# Patient Record
Sex: FEMALE | Race: WHITE | NOT HISPANIC OR LATINO | Employment: OTHER | URBAN - METROPOLITAN AREA
[De-identification: names, ages, dates, MRNs, and addresses within clinical notes are randomized per-mention and may not be internally consistent; named-entity substitution may affect disease eponyms.]

---

## 2017-11-09 ENCOUNTER — TELEPHONE (OUTPATIENT)
Dept: MAMMOGRAPHY | Facility: CLINIC | Age: 57
End: 2017-11-09

## 2017-11-13 ENCOUNTER — OFFICE VISIT (OUTPATIENT)
Dept: MAMMOGRAPHY | Facility: CLINIC | Age: 57
End: 2017-11-13

## 2017-11-13 VITALS
TEMPERATURE: 98.1 F | DIASTOLIC BLOOD PRESSURE: 60 MMHG | SYSTOLIC BLOOD PRESSURE: 112 MMHG | BODY MASS INDEX: 45.32 KG/M2 | WEIGHT: 282 LBS | HEIGHT: 66 IN | OXYGEN SATURATION: 97 % | HEART RATE: 88 BPM

## 2017-11-13 DIAGNOSIS — N64.4 MASTODYNIA: Primary | ICD-10-CM

## 2017-11-13 DIAGNOSIS — N63.0 LUMP OR MASS IN BREAST: ICD-10-CM

## 2017-11-13 PROCEDURE — 99214 OFFICE O/P EST MOD 30 MIN: CPT | Performed by: SURGERY

## 2017-11-13 RX ORDER — CYCLOBENZAPRINE HCL 10 MG
10 TABLET ORAL
COMMUNITY
Start: 2015-11-24 | End: 2022-04-12

## 2017-11-13 RX ORDER — METHOCARBAMOL 750 MG/1
750-1500 TABLET, FILM COATED ORAL
COMMUNITY
Start: 2017-04-05 | End: 2022-04-12

## 2017-11-13 NOTE — PROGRESS NOTES
Chief Complaint: Guerline Foley is a 57 y.o.. female here today for Mass (right breast) and Breast Pain (right)        History of Present Illness:  Patient presents with right breast mass and breast pain.  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  She was also treated with radiation therapy as part of her treatment.  She has had some off and on pain in that breast upper since the surgery but the patient states that over the last 3 months or so the pain has worsened.  She points to multiple areas on the breast where she has her pain and describes it as sharp and mostly constant.  It is worsened with palpation.  The patient underwent imaging studies and May of this year.  The breast tissue was felt to be mostly fatty replaced.  There are some changes at the lumpectomy site in the upper outer quadrant.  There was a 3.3 cm fat containing mass near the edge of the areola in the upper outer quadrant away from the lumpectomy site.  This was also ultrasounded and had imaging features consistent with fat necrosis.  I have personally reviewed those imaging studies and agree with the findings.  The patient has some upper spine issues for which she has seen a pain management doctor and apparently had some epidurals.      Review of Systems:  Review of Systems   Musculoskeletal: Positive for back pain, neck pain and neck stiffness.        Limited ROM in both shoulders   Skin:        The patient denies any noticeable changes to the skin of the breast.    Psychiatric/Behavioral: Positive for depression and sleep disturbance. The patient is nervous/anxious.    All other systems reviewed and are negative.       Past Medical and Surgical History:  Breast Biopsy History:  Patient has had the following breast biopsies:Right breast 2015-malignant  Breast Cancer HIstory:  Patient has the following past medical history of breast cancer treatment: high-grade DCIS  which was ER negative and [place] negative. Had radiation treatment and lumpectomy  Breast Operations, and year:  Right Lumpectomy 2015 per Dr. Valdez    History   Smoking Status   • Current Every Day Smoker   • Packs/day: 0.50   • Types: Cigarettes   • Start date: 1971   Smokeless Tobacco   • Not on file     Obstetric History:  Patient is postmenopausal, entered menopause naturally at age: 47   Number of pregnancies:3  Number of live births: 3  Number of abortions or miscarriages: 0  Age of delivery of first child: 18  Patient did not breast feed.  Length of time taking birth control pills:10 years  Patient has never taken hormone replacement    Past Surgical History:   Procedure Laterality Date   • BREAST LUMPECTOMY Right    • CHOLECYSTECTOMY     • HERNIA REPAIR      X 2   • TUBAL ABDOMINAL LIGATION         Past Medical History:   Diagnosis Date   • Anxiety    • Arthritis    • Chronic back pain    • DCIS (ductal carcinoma in situ)    • Depression    • Hyperlipidemia    • Vitamin D deficiency        Prior Hospitalizations, other than for surgery or childbirth, and year:  none    Social History:  Patient is .  Patient has 3 daughters.    Family History:  Family History   Problem Relation Age of Onset   • Breast cancer Mother 39   • Alcohol abuse Mother    • Bone cancer Mother 49   • Stomach cancer Maternal Uncle    • Lung cancer Maternal Uncle    • Alcohol abuse Father    • Alcohol abuse Sister    • Depression Sister    • Drug abuse Sister    • Alcohol abuse Brother    • Depression Brother    • Drug abuse Brother    • Cancer Maternal Aunt      unsure of what kind   • Breast cancer Cousin 50   • Brain cancer Cousin 60   • Breast cancer Cousin 50   • Breast cancer Cousin      pt states possibly 7 out of 10 Mat cousins with breast cancer   • Breast cancer Cousin    • Cancer Maternal Aunt      unsure of what kind   • Lung cancer Maternal Uncle        Vital Signs:  Vitals:    11/13/17 1120   BP: 112/60   Pulse: 88    Temp: 98.1 °F (36.7 °C)   SpO2: 97%       Medications:    Current Outpatient Prescriptions:     Current Outpatient Prescriptions:   •  ALPRAZolam (XANAX) 0.5 MG tablet, Take  by mouth., Disp: , Rfl:   •  buPROPion (WELLBUTRIN) 100 MG tablet, Take 100 mg by mouth 2 (two) times a day., Disp: , Rfl:   •  Cholecalciferol (VITAMIN D3) 56271 UNITS capsule, Take  by mouth., Disp: , Rfl:   •  cyclobenzaprine (FLEXERIL) 10 MG tablet, Take 10 mg by mouth., Disp: , Rfl:   •  gabapentin (NEURONTIN) 300 MG capsule, Take 300 mg by mouth 3 (three) times a day., Disp: , Rfl:   •  HYDROcodone-acetaminophen (HYCET) 7.5-325 MG/15ML solution, Take  by mouth., Disp: , Rfl:   •  sertraline (ZOLOFT) 100 MG tablet, Take  by mouth., Disp: , Rfl:   •  methocarbamol (ROBAXIN) 750 MG tablet, Take 750-1,500 mg by mouth., Disp: , Rfl:     Physical Examination:  General Appearance:   Patient is in no distress.  She is well kept and has an obese build.   Psychiatric:  Patient with appropriate mood and affect. Alert and oriented to self, time, and place.    Breast, RIGHT:  large sized, symmetric with the contralateral side. there is significant ptosis   Breast skin is without erythema, edema, rashes.  There are no visible abnormalities upon inspection during the arm-raising maneuver or with hands on hips in the sitting position. There is no nipple retraction, discharge or nipple/areolar skin changes.There is a palpable mass near the edge of the areola in the upper outer quadrant that is well circumscribed and quite tender.  The lumpectomy scar is present farther up in the upper outer quadrant and she also has an axillary incision.  The lumpectomy scar is also tender and the patient is also tender in the retroareolar and upper inner quadrant.    Breast, LEFT:  large sized, symmetric with the contralateral side.  there is significant ptosis Breast skin is without erythema, edema, rashes.  There are no visible abnormalities upon inspection during  the arm-raising maneuver or with hands on hips in the sitting position. There is no nipple retraction, discharge or nipple/areolar skin changes.There are no masses palpable in the sitting or supine positions.  There is no breast tenderness on this side.    Lymphatic:  There is no axillary, cervical, infraclavicular, or supraclavicular adenopathy bilaterally.  Eyes:  Pupils are round and reactive to light.  Cardiovascular:  Heart rate and rhythm are regular.  Respiratory:  Lungs are clear bilaterally with no crackles or wheezes in any lung field.  There are decreased breath sounds bilaterally  Gastrointestinal:  Abdomen is soft, nondistended, and nontender.  There is no obvious hepatosplenomegaly There are no scars from previous surgery.    Musculoskeletal:  Good strength in all 4 extremities.   There is good range of motion in both shoulders.    Skin:  No new skin lesions or rashes on the skin excluding the breast (see breast exam above).    Assessment:  1. Mastodynia    2. Lump or mass in breast          Plan:  I think the palpable lump is probably secondary to the methylene blue injection.  If this was the only tender area I would consider excising it but she is tender in multiple other areas and I do not think she would benefit from something done in this single area.  She also has had radiation which would make healing much more difficult.  I do think that she would benefit from better breast support.  She is currently wearing a somewhat loose sports bra.  I have encouraged her to contact [facility] or [facility] to inquire about better support bras.  She would also probably benefit from seeing a pain management doctor particularly on the chance that some of her back issues are playing a role here.  I otherwise do not have much to offer her at this point in time.      CPT coding:    Next Appointment:  No Follow-up on file.            EMR Dragon/transcription disclaimer:    Much of this encounter note is an  electronic transcription/translocation of spoken language to printed text.  The electronic translation of spoken language may permit erroneous, or at times, nonsensical words or phrases to be inadvertently transcribed.  Although I have reviewed the note from such areas, some may still exist.

## 2021-03-22 ENCOUNTER — BULK ORDERING (OUTPATIENT)
Dept: CASE MANAGEMENT | Facility: OTHER | Age: 61
End: 2021-03-22

## 2021-03-22 DIAGNOSIS — Z23 IMMUNIZATION DUE: ICD-10-CM

## 2022-04-12 ENCOUNTER — LAB (OUTPATIENT)
Dept: LAB | Facility: HOSPITAL | Age: 62
End: 2022-04-12

## 2022-04-12 ENCOUNTER — CONSULT (OUTPATIENT)
Dept: ONCOLOGY | Facility: CLINIC | Age: 62
End: 2022-04-12

## 2022-04-12 VITALS
TEMPERATURE: 96.8 F | RESPIRATION RATE: 16 BRPM | BODY MASS INDEX: 39.86 KG/M2 | HEIGHT: 68 IN | HEART RATE: 85 BPM | WEIGHT: 263 LBS | SYSTOLIC BLOOD PRESSURE: 121 MMHG | DIASTOLIC BLOOD PRESSURE: 81 MMHG | OXYGEN SATURATION: 95 %

## 2022-04-12 DIAGNOSIS — D05.11 DUCTAL CARCINOMA IN SITU (DCIS) OF RIGHT BREAST: Primary | ICD-10-CM

## 2022-04-12 LAB
BASOPHILS # BLD AUTO: 0.06 10*3/MM3 (ref 0–0.2)
BASOPHILS NFR BLD AUTO: 0.5 % (ref 0–1.5)
DEPRECATED RDW RBC AUTO: 42.9 FL (ref 37–54)
EOSINOPHIL # BLD AUTO: 0.08 10*3/MM3 (ref 0–0.4)
EOSINOPHIL NFR BLD AUTO: 0.7 % (ref 0.3–6.2)
ERYTHROCYTE [DISTWIDTH] IN BLOOD BY AUTOMATED COUNT: 12.6 % (ref 12.3–15.4)
HCT VFR BLD AUTO: 43.4 % (ref 34–46.6)
HGB BLD-MCNC: 14.2 G/DL (ref 12–15.9)
IMM GRANULOCYTES # BLD AUTO: 0.08 10*3/MM3 (ref 0–0.05)
IMM GRANULOCYTES NFR BLD AUTO: 0.7 % (ref 0–0.5)
LYMPHOCYTES # BLD AUTO: 3.34 10*3/MM3 (ref 0.7–3.1)
LYMPHOCYTES NFR BLD AUTO: 30.3 % (ref 19.6–45.3)
MCH RBC QN AUTO: 30.7 PG (ref 26.6–33)
MCHC RBC AUTO-ENTMCNC: 32.7 G/DL (ref 31.5–35.7)
MCV RBC AUTO: 93.7 FL (ref 79–97)
MONOCYTES # BLD AUTO: 0.59 10*3/MM3 (ref 0.1–0.9)
MONOCYTES NFR BLD AUTO: 5.4 % (ref 5–12)
NEUTROPHILS NFR BLD AUTO: 6.86 10*3/MM3 (ref 1.7–7)
NEUTROPHILS NFR BLD AUTO: 62.4 % (ref 42.7–76)
NRBC BLD AUTO-RTO: 0 /100 WBC (ref 0–0.2)
PLATELET # BLD AUTO: 258 10*3/MM3 (ref 140–450)
PMV BLD AUTO: 10.2 FL (ref 6–12)
RBC # BLD AUTO: 4.63 10*6/MM3 (ref 3.77–5.28)
WBC NRBC COR # BLD: 11.01 10*3/MM3 (ref 3.4–10.8)

## 2022-04-12 PROCEDURE — 85025 COMPLETE CBC W/AUTO DIFF WBC: CPT

## 2022-04-12 PROCEDURE — 36415 COLL VENOUS BLD VENIPUNCTURE: CPT

## 2022-04-12 PROCEDURE — 99204 OFFICE O/P NEW MOD 45 MIN: CPT | Performed by: INTERNAL MEDICINE

## 2022-04-12 RX ORDER — CARISOPRODOL 350 MG/1
TABLET ORAL
COMMUNITY
End: 2022-04-12

## 2022-04-12 RX ORDER — GABAPENTIN 400 MG/1
CAPSULE ORAL EVERY 8 HOURS SCHEDULED
COMMUNITY
Start: 2022-02-17

## 2022-04-12 RX ORDER — DICLOFENAC SODIUM 75 MG/1
TABLET, DELAYED RELEASE ORAL
COMMUNITY
End: 2022-04-12

## 2022-04-12 RX ORDER — LEVOFLOXACIN 500 MG/1
TABLET, FILM COATED ORAL
COMMUNITY
End: 2022-04-12

## 2022-04-12 RX ORDER — LEVOFLOXACIN 750 MG/1
TABLET ORAL
COMMUNITY

## 2022-04-12 RX ORDER — ATORVASTATIN CALCIUM 20 MG/1
TABLET, FILM COATED ORAL
COMMUNITY
End: 2022-04-12

## 2022-04-12 RX ORDER — OXYBUTYNIN CHLORIDE 5 MG/1
1 TABLET ORAL 2 TIMES DAILY
COMMUNITY
End: 2022-04-12

## 2022-04-12 RX ORDER — METFORMIN HYDROCHLORIDE 500 MG/5ML
SOLUTION ORAL
COMMUNITY
End: 2022-11-10

## 2022-04-12 RX ORDER — TIZANIDINE HYDROCHLORIDE 4 MG/1
CAPSULE, GELATIN COATED ORAL
COMMUNITY

## 2022-04-12 NOTE — PROGRESS NOTES
Subjective     REASON FOR CONSULTATION: DCIS right breast ER/[place] negative post excision with clear margins with a history of previous DCIS right breast [place] negative treated with lumpectomy and radiation in 2015  Provide an opinion on any further workup or treatment                             REQUESTING PHYSICIAN: MD Gagandeep Tejeda MD    RECORDS OBTAINED:  Records of the patients history including those obtained from the referring provider were reviewed and summarized in detail.    HISTORY OF PRESENT ILLNESS:  The patient is a 61 y.o. year old female who is here for an opinion about the above issue.    History of Present Illness patient is a 61-year-old female with a history of depression chronic back pain and severe arthritis in her left knee was noted on routine imaging In January of this year to have abnormal calcifications in the upper outer quadrant of the right breast and if it coming that required diagnostic imaging and biopsy Which was done on 2/9/2022 with the findings of DCIS intermediate grade measuring 4 mm ER/[place] negative.  Patient was referred to Dr. Carvajal and underwent Lumpectomy on 3/8/2022 months at which time there was a 1.3 mm area of residual high-grade DCIS.    The patient was referred to us to discuss further options.  She tells me she had genetic testing in 2015 with a 32 gene panel being negative but Dr. Carvajal recommended further testing because he is concerned her family history is so significant and he wonders if there may be some newer genes that can be tested .    Patient was diagnosed with high-grade DCIS of the right breast in 2015 when her mammogram showed abnormal calcifications in the upper outer quadrant of the right breast close to the area which was biopsied this time.  She had a lumpectomy the findings of 17 mm of high-grade DCIS there was a margin that was 0.5 mm and another that was 0.8 mm With 2 negative- sentinel  nodes also  ER/[place] negative at that time radiation was given but no prevention because she was ER/[place] negative    She is  3 para 3 first childbirth was at age 18 she did not breast-feed  Menarche was 11 menopause at age 47 she did not take hormone replacement    Family history is positive for mother  of breast cancer at age 49 she was diagnosed at age 37 she has 3 maternal first cousins with cancer 2 with breast cancer and 1 with an unknown type?  Ovarian she is 3 maternal uncles with cancer one with stomach cancer mother with lung cancer mother with colon cancer  She had a 32 gene panel which was negative in     She smoked a pack a day for 50 years and is down to 2 packs a week and is trying to stop.  She is not a drinker  She never had a DVT MI or stroke    She is wanting to get her left knee replaced soon as possible because it is causing a lot of problems    Past Medical History:   Diagnosis Date   • Anxiety    • Arthritis    • Chronic back pain    • DCIS (ductal carcinoma in situ)    • Depression    • Hyperlipidemia    • Vitamin D deficiency         Past Surgical History:   Procedure Laterality Date   • BREAST LUMPECTOMY Right    • CHOLECYSTECTOMY     • HERNIA REPAIR      X 2   • TUBAL ABDOMINAL LIGATION          Current Outpatient Medications on File Prior to Visit   Medication Sig Dispense Refill   • ALPRAZolam (XANAX) 0.5 MG tablet Take  by mouth.     • buPROPion (WELLBUTRIN) 100 MG tablet Take 100 mg by mouth 2 (two) times a day.     • Cholecalciferol (VITAMIN D3) 55715 UNITS capsule Take  by mouth.     • gabapentin (NEURONTIN) 400 MG capsule Every 8 (Eight) Hours.     • HYDROcodone-acetaminophen (HYCET) 7.5-325 MG/15ML solution Take  by mouth.     • levoFLOXacin (LEVAQUIN) 750 MG tablet levofloxacin 750 mg tablet     • metFORMIN HCl 500 MG/5ML solution TK 1 T PO  BID WITH BREAKFAST AND SUPPER     • Multiple Vitamins-Minerals (MULTIVITAMIN ADULT EXTRA C PO) multivitamin   1 a day     •  sertraline (ZOLOFT) 100 MG tablet Take  by mouth.     • TiZANidine (ZANAFLEX) 4 MG capsule 1 tablet as needed     • [DISCONTINUED] atorvastatin (LIPITOR) 20 MG tablet atorvastatin 20 mg tablet     • [DISCONTINUED] carisoprodol (SOMA) 350 MG tablet carisoprodol 350 mg tablet     • [DISCONTINUED] cyclobenzaprine (FLEXERIL) 10 MG tablet Take 10 mg by mouth.     • [DISCONTINUED] diclofenac (VOLTAREN) 75 MG EC tablet diclofenac sodium 75 mg tablet,delayed release   TAKE 1 TABLET BY MOUTH TWICE DAILY     • [DISCONTINUED] gabapentin (NEURONTIN) 300 MG capsule Take 300 mg by mouth 3 (three) times a day.     • [DISCONTINUED] levoFLOXacin (LEVAQUIN) 500 MG tablet levofloxacin 500 mg tablet     • [DISCONTINUED] methocarbamol (ROBAXIN) 750 MG tablet Take 750-1,500 mg by mouth.     • [DISCONTINUED] oxybutynin (DITROPAN) 5 MG tablet Take 1 tablet by mouth 2 (Two) Times a Day.     • [DISCONTINUED] topiramate (TOPAMAX) 200 MG tablet Every 12 (Twelve) Hours.       No current facility-administered medications on file prior to visit.        ALLERGIES:    Allergies   Allergen Reactions   • Atorvastatin    • Meperidine    • Morphine And Related    • Shellfish-Derived Products         Social History     Socioeconomic History   • Marital status: Single     Spouse name: Sher Mckinnon   • Years of education: College   Tobacco Use   • Smoking status: Current Every Day Smoker     Packs/day: 0.50     Types: Cigarettes     Start date: 1971   Substance and Sexual Activity   • Alcohol use: No   • Drug use: No   • Sexual activity: Yes     Partners: Male     Comment:         Family History   Problem Relation Age of Onset   • Breast cancer Mother 39   • Alcohol abuse Mother    • Bone cancer Mother 49   • Stomach cancer Maternal Uncle    • Lung cancer Maternal Uncle    • Alcohol abuse Father    • Alcohol abuse Sister    • Depression Sister    • Drug abuse Sister    • Alcohol abuse Brother    • Depression Brother    • Drug abuse Brother   "  • Cancer Maternal Aunt         unsure of what kind   • Breast cancer Cousin 50   • Brain cancer Cousin 60   • Breast cancer Cousin 50   • Breast cancer Cousin         pt states possibly 7 out of 10 Mat cousins with breast cancer   • Breast cancer Cousin    • Cancer Maternal Aunt         unsure of what kind   • Lung cancer Maternal Uncle         Review of Systems   Musculoskeletal: Positive for gait problem and joint swelling (left knee).        Objective     Vitals:    04/12/22 1542   BP: 121/81   Pulse: 85   Resp: 16   Temp: 96.8 °F (36 °C)   TempSrc: Temporal   SpO2: 95%   Weight: 119 kg (263 lb)   Height: 172.7 cm (68\")   PainSc:   6   PainLoc: Knee     Current Status 4/12/2022   ECOG score 1       Physical Exam  Chest:            CONSTITUTIONAL:  Vital signs reviewed.  No distress, looks comfortable.  EYES:  Conjunctivae and lids unremarkable.  PERRLA  EARS,NOSE,MOUTH,THROAT:  Ears and nose appear unremarkable.  Lips, teeth, gums appear unremarkable.  RESPIRATORY:  Normal respiratory effort.  Lungs clear to auscultation bilaterally.  BREASTS: Left breast is benign the right breast shows recent lumpectomy scar in the upper outer quadrant an old scar that was healed there is a firm nodule under the areola at 10:00 which she states is old and due to scarring from previous radiation  CARDIOVASCULAR:  Normal S1, S2.  No murmurs rubs or gallops.  No significant lower extremity edema.  Left knee is bandaged and  GASTROINTESTINAL: Abdomen appears unremarkable.  Nontender.  No hepatomegaly.  No splenomegaly.  LYMPHATIC:  No cervical, supraclavicular, axillary lymphadenopathy.  SKIN:  Warm.  No rashes.  PSYCHIATRIC:  Normal judgment and insight.  Normal mood and affect.      RECENT LABS:  Hematology WBC   Date Value Ref Range Status   04/12/2022 11.01 (H) 3.40 - 10.80 10*3/mm3 Final   10/12/2020 10.55 4.5 - 11.0 10*3/uL Final     RBC   Date Value Ref Range Status   04/12/2022 4.63 3.77 - 5.28 10*6/mm3 Final   10/12/2020 " "4.42 4.0 - 5.2 10*6/uL Final     Hemoglobin   Date Value Ref Range Status   04/12/2022 14.2 12.0 - 15.9 g/dL Final   10/12/2020 13.2 12.0 - 16.0 g/dL Final     Hematocrit   Date Value Ref Range Status   04/12/2022 43.4 34.0 - 46.6 % Final   10/12/2020 42.3 36.0 - 46.0 % Final     Platelets   Date Value Ref Range Status   04/12/2022 258 140 - 450 10*3/mm3 Final   10/12/2020 351 140 - 440 10*3/uL Final          Histologic Type:  Ductal carcinoma in situ (DCIS)        Comments:  Intermediate grade DCIS with associated microcalcifications and   apocrine cytology        Ca++ showing linear distribution sometimes replacing all breast epithelium   within ducts.        Negative for invasive carcinoma.        Breast Biomarkers        Estrogen Receptor (ER) Status:  Negative        Progesterone Receptor (PgR) Status:  Negative        Extent of In-Situ Carcinoma in the Core        % of DCIS:  50%        Number of Cores Total:  6        Size of DCIS in a Single Core (mm):  4        Architectural Patterns:  Solid        Nuclear Grade:  Grade II (intermediate)        Necrosis:  Not identified        Microcalcifications:  Present in DCIS         Synoptic Report:   Specimen        Procedure:  Excision (less than total mastectomy)        Specimen Laterality:  Right   Tumor        Tumor Site:  Upper outer quadrant        Histologic Type:  Ductal carcinoma in situ        Comments:  Few minute foci of residual intermediate to high grade DCIS   without microcalcifications in tissue surrounding prior biopsy site.        Size (Extent) of DCIS:  1.3 Millimeters (mm)        Number of Blocks with DCIS:  3        Number of Blocks Examined:  15        Architectural Patterns:  Comedo;  Cribriform        Nuclear Grade:  Grade III (high)        Necrosis:  Present, central (expansive \"comedo\" necrosis)        Microcalcifications:  Present in nonneoplastic tissue   Margins        Margin Status:  All margins negative for DCIS        Distance from " DCIS to Closest Margin:  Greater than 10 mm        Closest Margin(s) to DCIS:  Anterior;  Posterior;  Superior;  Inferior;   Medial;  Lateral        Comment on Final Margins:  Margin assessment includes evaluation of   additional margin excisions in parts B-E.   Regional Lymph Nodes        Regional Lymph Node Status:  Not applicable (no regional lymph nodes   submitted or found)   Distant Metastasis        Distant Site(s) Involved:  Not applicable   Pathologic Stage Classification (pTNM, AJCC 8th Edition)        TNM Descriptors:  Not applicable        pT Category:  pTis (DCIS)        Regional Lymph Nodes Modifier:  Not applicable        pN Category:  pN not assigned (no nodes submitted or found)        pM Category:  Not applicable - pM cannot be determined from the submitted   specimen(s)   Additional Findings        Additional Findings:  Prior biopsy site with clip; stromal calcifications   present in area of biopsy   Special Studies        Estrogen Receptor (ER) Status:  Negative        Progesterone Receptor (PgR) Status:  Negative        Testing Performed on Case Number:  [id number]   Comments        DCIS was more extensive in the prior right breast UOQ mid biopsy   ([id number]), where it was seen in multiple cores representing 50% of sampled   tissue and associated with microcalcifications.   Best Blocks for Future Studies:  Prior biopsy [id number] A1.      Assessment/Plan   1.pTis Nx right breast ER/FL negative recurrence in a radiated breast post lumpectomy-4 mm area  · History of high-grade DCIS right breast in 2015 treated with lumpectomy and radiation-margins were 0.5 and 0.8 mm at the time-no prevention given because of ER/FL negative status    2.  -32 gene genetic panel in 2015 with a strong family history of breast cancer including her mother was diagnosed at age 39    3.  Severe degenerative disease in the left knee requiring replacement    4.  Tobacco abuse for 50 years needs screening CAT scans of the  chest    5.  Anxiety and depression currently on Wellbutrin and Xanax and Zoloft    6.  Type 2 diabetes on Metformin    Plan  Discussed with patient that there is no benefit to tamoxifen or aromatase inhibitors in the setting of her ER/[place] negative DCIS.  There is no role for additional radiation at this time    I agree with Dr. Carvajal that with such a small area of involvement very close to her previous site of involvement with  a fatty replaced breast that is easy to image I think we can afford to watch closely but if there is any further DCIS mastectomy would be indicated.    I will plan to see her back in 6 months and she tells me she is getting additional genetic testing if anything shows up positive we will review this with her    In the setting of normal bone density I do not think Evista is indicated

## 2022-11-10 ENCOUNTER — OFFICE VISIT (OUTPATIENT)
Dept: ONCOLOGY | Facility: CLINIC | Age: 62
End: 2022-11-10

## 2022-11-10 ENCOUNTER — LAB (OUTPATIENT)
Dept: LAB | Facility: HOSPITAL | Age: 62
End: 2022-11-10

## 2022-11-10 ENCOUNTER — TELEPHONE (OUTPATIENT)
Dept: ONCOLOGY | Facility: OTHER | Age: 62
End: 2022-11-10

## 2022-11-10 VITALS
DIASTOLIC BLOOD PRESSURE: 67 MMHG | HEIGHT: 68 IN | RESPIRATION RATE: 16 BRPM | SYSTOLIC BLOOD PRESSURE: 115 MMHG | BODY MASS INDEX: 38.66 KG/M2 | WEIGHT: 255.1 LBS | OXYGEN SATURATION: 96 % | TEMPERATURE: 97.5 F | HEART RATE: 85 BPM

## 2022-11-10 DIAGNOSIS — D05.11 DUCTAL CARCINOMA IN SITU (DCIS) OF RIGHT BREAST: Primary | ICD-10-CM

## 2022-11-10 DIAGNOSIS — Z12.31 ENCOUNTER FOR SCREENING MAMMOGRAM FOR MALIGNANT NEOPLASM OF BREAST: ICD-10-CM

## 2022-11-10 DIAGNOSIS — D05.11 DUCTAL CARCINOMA IN SITU (DCIS) OF RIGHT BREAST: ICD-10-CM

## 2022-11-10 LAB
ALBUMIN SERPL-MCNC: 4.1 G/DL (ref 3.5–5.2)
ALBUMIN/GLOB SERPL: 1.4 G/DL (ref 1.1–2.4)
ALP SERPL-CCNC: 106 U/L (ref 38–116)
ALT SERPL W P-5'-P-CCNC: 16 U/L (ref 0–33)
ANION GAP SERPL CALCULATED.3IONS-SCNC: 11.8 MMOL/L (ref 5–15)
AST SERPL-CCNC: 17 U/L (ref 0–32)
BASOPHILS # BLD AUTO: 0.08 10*3/MM3 (ref 0–0.2)
BASOPHILS NFR BLD AUTO: 0.7 % (ref 0–1.5)
BILIRUB SERPL-MCNC: 0.2 MG/DL (ref 0.2–1.2)
BUN SERPL-MCNC: 11 MG/DL (ref 6–20)
BUN/CREAT SERPL: 15.1 (ref 7.3–30)
CALCIUM SPEC-SCNC: 9.9 MG/DL (ref 8.5–10.2)
CHLORIDE SERPL-SCNC: 103 MMOL/L (ref 98–107)
CO2 SERPL-SCNC: 27.2 MMOL/L (ref 22–29)
CREAT SERPL-MCNC: 0.73 MG/DL (ref 0.6–1.1)
DEPRECATED RDW RBC AUTO: 49 FL (ref 37–54)
EGFRCR SERPLBLD CKD-EPI 2021: 92 ML/MIN/1.73
EOSINOPHIL # BLD AUTO: 0.11 10*3/MM3 (ref 0–0.4)
EOSINOPHIL NFR BLD AUTO: 1 % (ref 0.3–6.2)
ERYTHROCYTE [DISTWIDTH] IN BLOOD BY AUTOMATED COUNT: 13.6 % (ref 12.3–15.4)
GLOBULIN UR ELPH-MCNC: 2.9 GM/DL (ref 1.8–3.5)
GLUCOSE SERPL-MCNC: 111 MG/DL (ref 74–124)
HCT VFR BLD AUTO: 43.9 % (ref 34–46.6)
HGB BLD-MCNC: 14.3 G/DL (ref 12–15.9)
IMM GRANULOCYTES # BLD AUTO: 0.08 10*3/MM3 (ref 0–0.05)
IMM GRANULOCYTES NFR BLD AUTO: 0.7 % (ref 0–0.5)
LYMPHOCYTES # BLD AUTO: 3.52 10*3/MM3 (ref 0.7–3.1)
LYMPHOCYTES NFR BLD AUTO: 32.8 % (ref 19.6–45.3)
MCH RBC QN AUTO: 31.6 PG (ref 26.6–33)
MCHC RBC AUTO-ENTMCNC: 32.6 G/DL (ref 31.5–35.7)
MCV RBC AUTO: 96.9 FL (ref 79–97)
MONOCYTES # BLD AUTO: 0.62 10*3/MM3 (ref 0.1–0.9)
MONOCYTES NFR BLD AUTO: 5.8 % (ref 5–12)
NEUTROPHILS NFR BLD AUTO: 59 % (ref 42.7–76)
NEUTROPHILS NFR BLD AUTO: 6.33 10*3/MM3 (ref 1.7–7)
NRBC BLD AUTO-RTO: 0 /100 WBC (ref 0–0.2)
PLATELET # BLD AUTO: 347 10*3/MM3 (ref 140–450)
PMV BLD AUTO: 8.7 FL (ref 6–12)
POTASSIUM SERPL-SCNC: 4 MMOL/L (ref 3.5–4.7)
PROT SERPL-MCNC: 7 G/DL (ref 6.3–8)
RBC # BLD AUTO: 4.53 10*6/MM3 (ref 3.77–5.28)
SODIUM SERPL-SCNC: 142 MMOL/L (ref 134–145)
WBC NRBC COR # BLD: 10.74 10*3/MM3 (ref 3.4–10.8)

## 2022-11-10 PROCEDURE — 85025 COMPLETE CBC W/AUTO DIFF WBC: CPT

## 2022-11-10 PROCEDURE — 99213 OFFICE O/P EST LOW 20 MIN: CPT | Performed by: INTERNAL MEDICINE

## 2022-11-10 PROCEDURE — 36415 COLL VENOUS BLD VENIPUNCTURE: CPT

## 2022-11-10 PROCEDURE — 80053 COMPREHEN METABOLIC PANEL: CPT

## 2022-11-10 RX ORDER — BUPROPION HYDROCHLORIDE 150 MG/1
TABLET ORAL
COMMUNITY
Start: 2022-11-09

## 2022-11-10 RX ORDER — ERGOCALCIFEROL 1.25 MG/1
CAPSULE ORAL
COMMUNITY

## 2022-11-10 NOTE — TELEPHONE ENCOUNTER
LINDY MELCHOR PT DAUGHTER CALLED AND NEEDS FOR ADY TO CALL HER BACK -[phone] AS PT LOCKED HER KEYS IN THE CAR. SPOKE TO EVARISTO TO GIVE THE MSG TO PT.

## 2022-11-10 NOTE — PROGRESS NOTES
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    She is not a candidate for prevention because this is ER/RI negative and we are watching closely because a nodular area near the nipple has become slightly larger and her estimated over the last year and a half    She did have additional genetic testing done in the expanded profile was also totally negative    She is due for mammograms again in January or February  Ultrasound because her breasts are fatty replaced and I do not think an MRI is needed  She is up-to-date with colonoscopies    Discussed with patient that there is no benefit to tamoxifen or aromatase inhibitors in the setting of her ER/RI negative DCIS.  There is no role for additional radiation at this time    I agree with Dr. Carvajal that with such a small area of involvement very close to her previous site of involvement with  a fatty replaced breast that is easy to image I think we can afford to watch closely but if there is any further DCIS mastectomy would be indicated.      Past Medical History:   Diagnosis Date   • Anxiety    • Arthritis    • Chronic back pain    • DCIS (ductal carcinoma in situ)    • Depression    • Diabetes mellitus (HCC)    • Hyperlipidemia    • Vitamin D deficiency         Past Surgical History:   Procedure Laterality Date   • BREAST LUMPECTOMY Right    • CHOLECYSTECTOMY     • HERNIA REPAIR      X 2   • TUBAL ABDOMINAL LIGATION     Oncologic history  patient is a  61-year-old female with a history of depression chronic back pain and severe arthritis in her left knee was noted on routine imaging In January of this year to have abnormal calcifications in the upper outer quadrant of the right breast and if it coming that required diagnostic imaging and biopsy Which was done on 2022 with the findings of DCIS intermediate grade measuring 4 mm ER/LA negative.  Patient was referred to Dr. Carvajal and underwent Lumpectomy on 3/8/2022 months at which time there was a 1.3 mm area of residual high-grade DCIS.    The patient was referred to us to discuss further options.  She tells me she had genetic testing in  with a 32 gene panel being negative but Dr. Carvajal recommended further testing because he is concerned her family history is so significant and he wonders if there may be some newer genes that can be tested .    Patient was diagnosed with high-grade DCIS of the right breast in  when her mammogram showed abnormal calcifications in the upper outer quadrant of the right breast close to the area which was biopsied this time.  She had a lumpectomy the findings of 17 mm of high-grade DCIS there was a margin that was 0.5 mm and another that was 0.8 mm With 2 negative- sentinel nodes also  ER/LA negative at that time radiation was given but no prevention because she was ER/LA negative    She is  3 para 3 first childbirth was at age 18 she did not breast-feed  Menarche was 11 menopause at age 47 she did not take hormone replacement    Family history is positive for mother  of breast cancer at age 49 she was diagnosed at age 37 she has 3 maternal first cousins with cancer 2 with breast cancer and 1 with an unknown type?  Ovarian she is 3 maternal uncles with cancer one with stomach cancer mother with lung cancer mother with colon cancer  She had a 32 gene panel which was negative in     She smoked a pack a day for 50 years and is down to 2 packs a week and is trying  to stop.  She is not a drinker  She never had a DVT MI or stroke    She is wanting to get her left knee replaced soon as possible because it is causing a lot of problems      Current Outpatient Medications on File Prior to Visit   Medication Sig Dispense Refill   • ALPRAZolam (XANAX) 0.5 MG tablet Take  by mouth.     • buPROPion XL (WELLBUTRIN XL) 150 MG 24 hr tablet      • ergocalciferol (ERGOCALCIFEROL) 1.25 MG (11686 UT) capsule ergocalciferol (vitamin D2) 1,250 mcg (50,000 unit) capsule   TAKE 1 CAPSULE BY MOUTH EVERY WEEK     • gabapentin (NEURONTIN) 400 MG capsule Every 8 (Eight) Hours.     • HYDROcodone-acetaminophen (HYCET) 7.5-325 MG/15ML solution Take  by mouth.     • levoFLOXacin (LEVAQUIN) 750 MG tablet levofloxacin 750 mg tablet     • metFORMIN (GLUCOPHAGE) 1000 MG tablet Take 1 tablet by mouth.     • Multiple Vitamins-Minerals (MULTIVITAMIN ADULT EXTRA C PO) multivitamin   1 a day     • sertraline (ZOLOFT) 100 MG tablet Take  by mouth.     • TiZANidine (ZANAFLEX) 4 MG capsule 1 tablet as needed     • [DISCONTINUED] buPROPion (WELLBUTRIN) 100 MG tablet Take 100 mg by mouth 2 (two) times a day.     • [DISCONTINUED] Cholecalciferol (VITAMIN D3) 80955 UNITS capsule Take  by mouth.     • [DISCONTINUED] metFORMIN HCl 500 MG/5ML solution TK 1 T PO  BID WITH BREAKFAST AND SUPPER       No current facility-administered medications on file prior to visit.        ALLERGIES:    Allergies   Allergen Reactions   • Atorvastatin    • Meperidine    • Morphine And Related    • Shellfish-Derived Products         Social History     Socioeconomic History   • Marital status: Single     Spouse name: Sher Mckinnon   • Years of education: College   Tobacco Use   • Smoking status: Every Day     Packs/day: 0.50     Types: Cigarettes     Start date: 1971   Substance and Sexual Activity   • Alcohol use: No   • Drug use: No   • Sexual activity: Yes     Partners: Male     Comment:         Family History   Problem Relation  "Age of Onset   • Breast cancer Mother 39   • Alcohol abuse Mother    • Bone cancer Mother 49   • Alcohol abuse Father    • Mental illness Sister    • Alcohol abuse Sister    • Depression Sister    • Drug abuse Sister    • Alcohol abuse Brother    • Depression Brother    • Drug abuse Brother    • Heart failure Brother    • Cancer Maternal Aunt         unsure of what kind   • Cancer Maternal Aunt         unsure of what kind   • Stomach cancer Maternal Uncle    • Lung cancer Maternal Uncle    • Lung cancer Maternal Uncle    • Breast cancer Cousin 50   • Brain cancer Cousin 60   • Breast cancer Cousin 50   • Breast cancer Cousin         pt states possibly 7 out of 10 Mat cousins with breast cancer   • Breast cancer Cousin         Review of Systems   Musculoskeletal: Positive for gait problem and joint swelling (left knee).        Objective     Vitals:    11/10/22 1526   BP: 115/67   Pulse: 85   Resp: 16   Temp: 97.5 °F (36.4 °C)   TempSrc: Temporal   SpO2: 96%   Weight: 116 kg (255 lb 1.6 oz)   Height: 172.7 cm (67.99\")   PainSc: 0-No pain     Current Status 11/10/2022   ECOG score 0       Physical Exam  Chest:            CONSTITUTIONAL:  Vital signs reviewed.  No distress, looks comfortable.  EYES:  Conjunctivae and lids unremarkable.  PERRLA  EARS,NOSE,MOUTH,THROAT:  Ears and nose appear unremarkable.  Lips, teeth, gums appear unremarkable.  RESPIRATORY:  Normal respiratory effort.  Lungs clear to auscultation bilaterally.  BREASTS: Left breast is benign the right breast shows recent lumpectomy scar in the upper outer quadrant an old scar that was healed there is a firm nodule under the areola at 10:00 which she states is old and due to scarring from previous radiation 3x3 cm-slightly larger  CARDIOVASCULAR:  Normal S1, S2.  No murmurs rubs or gallops.  No significant lower extremity edema.  Left knee is bandaged and  GASTROINTESTINAL: Abdomen appears unremarkable.  Nontender.  No hepatomegaly.  No " splenomegaly.  LYMPHATIC:  No cervical, supraclavicular, axillary lymphadenopathy.  SKIN:  Warm.  No rashes.  PSYCHIATRIC:  Normal judgment and insight.  Normal mood and affect.  I have reexamined the patient and the results are consistent with the previously documented exam. Neville Spencer MD       RECENT LABS:  Hematology WBC   Date Value Ref Range Status   11/10/2022 10.74 3.40 - 10.80 10*3/mm3 Final   05/11/2022 12.91 (H) 4.5 - 11.0 10*3/uL Final     RBC   Date Value Ref Range Status   11/10/2022 4.53 3.77 - 5.28 10*6/mm3 Final   05/11/2022 3.85 (L) 4.0 - 5.2 10*6/uL Final     Hemoglobin   Date Value Ref Range Status   11/10/2022 14.3 12.0 - 15.9 g/dL Final   05/11/2022 11.8 (L) 12.0 - 16.0 g/dL Final     Hematocrit   Date Value Ref Range Status   11/10/2022 43.9 34.0 - 46.6 % Final   05/11/2022 35.9 (L) 36.0 - 46.0 % Final     Platelets   Date Value Ref Range Status   11/10/2022 347 140 - 450 10*3/mm3 Final   05/11/2022 280 140 - 440 10*3/uL Final          Histologic Type:  Ductal carcinoma in situ (DCIS)        Comments:  Intermediate grade DCIS with associated microcalcifications and   apocrine cytology        Ca++ showing linear distribution sometimes replacing all breast epithelium   within ducts.        Negative for invasive carcinoma.        Breast Biomarkers        Estrogen Receptor (ER) Status:  Negative        Progesterone Receptor (PgR) Status:  Negative        Extent of In-Situ Carcinoma in the Core        % of DCIS:  50%        Number of Cores Total:  6        Size of DCIS in a Single Core (mm):  4        Architectural Patterns:  Solid        Nuclear Grade:  Grade II (intermediate)        Necrosis:  Not identified        Microcalcifications:  Present in DCIS         Synoptic Report:   Specimen        Procedure:  Excision (less than total mastectomy)        Specimen Laterality:  Right   Tumor        Tumor Site:  Upper outer quadrant        Histologic Type:  Ductal carcinoma in situ         "Comments:  Few minute foci of residual intermediate to high grade DCIS   without microcalcifications in tissue surrounding prior biopsy site.        Size (Extent) of DCIS:  1.3 Millimeters (mm)        Number of Blocks with DCIS:  3        Number of Blocks Examined:  15        Architectural Patterns:  [place]edo;  Cribriform        Nuclear Grade:  Grade III (high)        Necrosis:  Present, central (expansive \"comedo\" necrosis)        Microcalcifications:  Present in nonneoplastic tissue   Margins        Margin Status:  All margins negative for DCIS        Distance from DCIS to Closest Margin:  Greater than 10 mm        Closest Margin(s) to DCIS:  Anterior;  Posterior;  Superior;  Inferior;   Medial;  Lateral        Comment on Final Margins:  Margin assessment includes evaluation of   additional margin excisions in parts B-E.   Regional Lymph Nodes        Regional Lymph Node Status:  Not applicable (no regional lymph nodes   submitted or found)   Distant Metastasis        Distant Site(s) Involved:  Not applicable   Pathologic Stage Classification (pTNM, AJCC 8th Edition)        TNM Descriptors:  Not applicable        pT Category:  pTis (DCIS)        Regional Lymph Nodes Modifier:  Not applicable        pN Category:  pN not assigned (no nodes submitted or found)        pM Category:  Not applicable - pM cannot be determined from the submitted   specimen(s)   Additional Findings        Additional Findings:  Prior biopsy site with clip; stromal calcifications   present in area of biopsy   Special Studies        Estrogen Receptor (ER) Status:  Negative        Progesterone Receptor (PgR) Status:  Negative        Testing Performed on Case Number:  [id number]   Comments        DCIS was more extensive in the prior right breast UOQ mid biopsy   ([id number]), where it was seen in multiple cores representing 50% of sampled   tissue and associated with microcalcifications.   Best Blocks for Future Studies:  Prior biopsy [id number] A1.  "     Assessment & Plan   1.pTis Nx right breast ER/OK negative recurrence in a radiated breast post lumpectomy-4 mm area  · History of high-grade DCIS right breast in 2015 treated with lumpectomy and radiation-margins were 0.5 and 0.8 mm at the time-no prevention given because of ER/OK negative status  · Enlarging nodule at the 10 o'clock position of the right breast ultrasound ordered    2.  -32 gene genetic panel in 2015 with a strong family history of breast cancer including her mother was diagnosed at age 39  · Extended testing done with genetics was also negative    3.  Severe degenerative disease in the left knee requiring replacement    4.  Tobacco abuse for 50 years needs screening CAT scans of the chest    5.  Anxiety and depression currently on Wellbutrin and Xanax and Zoloft    6.  Type 2 diabetes on Metformin    Plan    1. will plan to see her back in 12 months and we will add an ultrasound because of the lumpectomy site nodularity with her mammogram in January  In the setting of normal bone density I do not think Evista is indicated

## 2023-01-11 DIAGNOSIS — N64.59 ABNORMAL BREAST EXAM: ICD-10-CM

## 2023-01-11 DIAGNOSIS — D05.11 DUCTAL CARCINOMA IN SITU (DCIS) OF RIGHT BREAST: Primary | ICD-10-CM

## 2023-01-16 ENCOUNTER — APPOINTMENT (OUTPATIENT)
Dept: ULTRASOUND IMAGING | Facility: HOSPITAL | Age: 63
End: 2023-01-16
Payer: COMMERCIAL

## 2023-01-16 ENCOUNTER — HOSPITAL ENCOUNTER (OUTPATIENT)
Dept: MAMMOGRAPHY | Facility: HOSPITAL | Age: 63
End: 2023-01-16
Payer: COMMERCIAL

## 2023-02-10 ENCOUNTER — HOSPITAL ENCOUNTER (OUTPATIENT)
Dept: ULTRASOUND IMAGING | Facility: HOSPITAL | Age: 63
Discharge: HOME | End: 2023-02-10
Payer: MEDICARE

## 2023-02-10 ENCOUNTER — HOSPITAL ENCOUNTER (OUTPATIENT)
Dept: MAMMOGRAPHY | Facility: HOSPITAL | Age: 63
Discharge: HOME | End: 2023-02-10
Payer: MEDICARE

## 2023-02-10 DIAGNOSIS — D05.11 DUCTAL CARCINOMA IN SITU (DCIS) OF RIGHT BREAST: ICD-10-CM

## 2023-02-10 DIAGNOSIS — N64.59 ABNORMAL BREAST EXAM: ICD-10-CM

## 2023-02-10 PROCEDURE — G0279 TOMOSYNTHESIS, MAMMO: HCPCS

## 2023-02-10 PROCEDURE — 76641 ULTRASOUND BREAST COMPLETE: CPT

## 2023-02-10 PROCEDURE — 77066 DX MAMMO INCL CAD BI: CPT

## 2023-12-27 ENCOUNTER — LAB (OUTPATIENT)
Dept: LAB | Facility: HOSPITAL | Age: 63
End: 2023-12-27
Payer: MEDICARE

## 2023-12-27 ENCOUNTER — OFFICE VISIT (OUTPATIENT)
Dept: ONCOLOGY | Facility: CLINIC | Age: 63
End: 2023-12-27
Payer: MEDICARE

## 2023-12-27 VITALS
TEMPERATURE: 96.8 F | HEART RATE: 87 BPM | RESPIRATION RATE: 16 BRPM | WEIGHT: 256.8 LBS | BODY MASS INDEX: 38.92 KG/M2 | SYSTOLIC BLOOD PRESSURE: 121 MMHG | OXYGEN SATURATION: 95 % | HEIGHT: 68 IN | DIASTOLIC BLOOD PRESSURE: 83 MMHG

## 2023-12-27 DIAGNOSIS — N64.4 BREAST PAIN: ICD-10-CM

## 2023-12-27 DIAGNOSIS — D05.11 DUCTAL CARCINOMA IN SITU (DCIS) OF RIGHT BREAST: ICD-10-CM

## 2023-12-27 DIAGNOSIS — D05.11 DUCTAL CARCINOMA IN SITU (DCIS) OF RIGHT BREAST: Primary | ICD-10-CM

## 2023-12-27 LAB
ALBUMIN SERPL-MCNC: 4 G/DL (ref 3.5–5.2)
ALBUMIN/GLOB SERPL: 1.4 G/DL
ALP SERPL-CCNC: 91 U/L (ref 39–117)
ALT SERPL W P-5'-P-CCNC: 5 U/L (ref 1–33)
ANION GAP SERPL CALCULATED.3IONS-SCNC: 12.9 MMOL/L (ref 5–15)
AST SERPL-CCNC: 17 U/L (ref 1–32)
BASOPHILS # BLD AUTO: 0.07 10*3/MM3 (ref 0–0.2)
BASOPHILS NFR BLD AUTO: 0.7 % (ref 0–1.5)
BILIRUB SERPL-MCNC: 0.4 MG/DL (ref 0–1.2)
BUN SERPL-MCNC: 9 MG/DL (ref 8–23)
BUN/CREAT SERPL: 13.2 (ref 7–25)
CALCIUM SPEC-SCNC: 9.3 MG/DL (ref 8.6–10.5)
CHLORIDE SERPL-SCNC: 104 MMOL/L (ref 98–107)
CO2 SERPL-SCNC: 24.1 MMOL/L (ref 22–29)
CREAT SERPL-MCNC: 0.68 MG/DL (ref 0.57–1)
DEPRECATED RDW RBC AUTO: 42.8 FL (ref 37–54)
EGFRCR SERPLBLD CKD-EPI 2021: 96.8 ML/MIN/1.73
EOSINOPHIL # BLD AUTO: 0.1 10*3/MM3 (ref 0–0.4)
EOSINOPHIL NFR BLD AUTO: 1 % (ref 0.3–6.2)
ERYTHROCYTE [DISTWIDTH] IN BLOOD BY AUTOMATED COUNT: 12.2 % (ref 12.3–15.4)
GLOBULIN UR ELPH-MCNC: 2.9 GM/DL
GLUCOSE SERPL-MCNC: 213 MG/DL (ref 65–99)
HCT VFR BLD AUTO: 46.4 % (ref 34–46.6)
HGB BLD-MCNC: 15.3 G/DL (ref 12–15.9)
IMM GRANULOCYTES # BLD AUTO: 0.05 10*3/MM3 (ref 0–0.05)
IMM GRANULOCYTES NFR BLD AUTO: 0.5 % (ref 0–0.5)
LYMPHOCYTES # BLD AUTO: 3.15 10*3/MM3 (ref 0.7–3.1)
LYMPHOCYTES NFR BLD AUTO: 32 % (ref 19.6–45.3)
MCH RBC QN AUTO: 31.5 PG (ref 26.6–33)
MCHC RBC AUTO-ENTMCNC: 33 G/DL (ref 31.5–35.7)
MCV RBC AUTO: 95.5 FL (ref 79–97)
MONOCYTES # BLD AUTO: 0.53 10*3/MM3 (ref 0.1–0.9)
MONOCYTES NFR BLD AUTO: 5.4 % (ref 5–12)
NEUTROPHILS NFR BLD AUTO: 5.94 10*3/MM3 (ref 1.7–7)
NEUTROPHILS NFR BLD AUTO: 60.4 % (ref 42.7–76)
NRBC BLD AUTO-RTO: 0 /100 WBC (ref 0–0.2)
PLATELET # BLD AUTO: 338 10*3/MM3 (ref 140–450)
PMV BLD AUTO: 9.1 FL (ref 6–12)
POTASSIUM SERPL-SCNC: 4 MMOL/L (ref 3.5–5.2)
PROT SERPL-MCNC: 6.9 G/DL (ref 6–8.5)
RBC # BLD AUTO: 4.86 10*6/MM3 (ref 3.77–5.28)
SODIUM SERPL-SCNC: 141 MMOL/L (ref 136–145)
WBC NRBC COR # BLD AUTO: 9.84 10*3/MM3 (ref 3.4–10.8)

## 2023-12-27 PROCEDURE — 85025 COMPLETE CBC W/AUTO DIFF WBC: CPT

## 2023-12-27 PROCEDURE — 80053 COMPREHEN METABOLIC PANEL: CPT

## 2023-12-27 NOTE — PROGRESS NOTES
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    She is not a candidate for prevention because this is ER/WY negative and we are watching closely because a nodular area near the nipple has become slightly larger and her estimated over the last year and a half    She did have additional genetic testing done in the expanded profile was also totally negative    She is due for mammograms again in January 2024 and I have added again and ultrasound because of the nodule and the fact that her breasts are fatty replaced and I do not think an MRI is needed  She is up-to-date with colonoscopies    Discussed with patient that there is no benefit to tamoxifen or aromatase inhibitors in the setting of her ER/WY negative DCIS.  There is no role for additional radiation at this time    I agree with Dr. Carvajal that with such a small area of involvement very close to her previous site of involvement with  a fatty replaced breast that is easy to image I think we can afford to watch closely but if there is any further DCIS mastectomy would be indicated.      Past Medical History:   Diagnosis Date    Anxiety     Arthritis     Bruxism (teeth grinding)     Chronic back pain     DCIS (ductal carcinoma in situ)     Right    DDD (degenerative disc disease), lumbar     Depression     Diabetes mellitus     GERD (gastroesophageal reflux disease)     Hiatal hernia     History of colon polyp     History of snoring     History of stomach  ulcer     Due to H pylori    Hyperlipidemia     Vitamin D deficiency         Past Surgical History:   Procedure Laterality Date    BREAST LUMPECTOMY Right     CHOLECYSTECTOMY      COLONOSCOPY      HERNIA REPAIR      x2    TOTAL KNEE ARTHROPLASTY Left     TUBAL ABDOMINAL LIGATION     Oncologic history  patient is a 61-year-old female with a history of depression chronic back pain and severe arthritis in her left knee was noted on routine imaging In January of this year to have abnormal calcifications in the upper outer quadrant of the right breast and if it coming that required diagnostic imaging and biopsy Which was done on 2022 with the findings of DCIS intermediate grade measuring 4 mm ER/AL negative.  Patient was referred to Dr. Carvajal and underwent Lumpectomy on 3/8/2022 months at which time there was a 1.3 mm area of residual high-grade DCIS.    The patient was referred to us to discuss further options.  She tells me she had genetic testing in  with a 32 gene panel being negative but Dr. Carvajal recommended further testing because he is concerned her family history is so significant and he wonders if there may be some newer genes that can be tested .    Patient was diagnosed with high-grade DCIS of the right breast in  when her mammogram showed abnormal calcifications in the upper outer quadrant of the right breast close to the area which was biopsied this time.  She had a lumpectomy the findings of 17 mm of high-grade DCIS there was a margin that was 0.5 mm and another that was 0.8 mm With 2 negative- sentinel nodes also  ER/AL negative at that time radiation was given but no prevention because she was ER/AL negative    She is  3 para 3 first childbirth was at age 18 she did not breast-feed  Menarche was 11 menopause at age 47 she did not take hormone replacement    Family history is positive for mother  of breast cancer at age 49 she was diagnosed at age 37 she has 3 maternal  first cousins with cancer 2 with breast cancer and 1 with an unknown type?  Ovarian she is 3 maternal uncles with cancer one with stomach cancer mother with lung cancer mother with colon cancer  She had a 32 gene panel which was negative in 2015    She smoked a pack a day for 50 years and is down to 2 packs a week and is trying to stop.  She is not a drinker  She never had a DVT MI or stroke    She is wanting to get her left knee replaced soon as possible because it is causing a lot of problems      Current Outpatient Medications on File Prior to Visit   Medication Sig Dispense Refill    ALPRAZolam (XANAX) 0.5 MG tablet Take  by mouth.      buPROPion XL (WELLBUTRIN XL) 150 MG 24 hr tablet       gabapentin (NEURONTIN) 400 MG capsule Every 8 (Eight) Hours.      HYDROcodone-acetaminophen (HYCET) 7.5-325 MG/15ML solution Take  by mouth.      metFORMIN (GLUCOPHAGE) 1000 MG tablet Take 1 tablet by mouth.      Multiple Vitamins-Minerals (MULTIVITAMIN ADULT EXTRA C PO) multivitamin   1 a day      sertraline (ZOLOFT) 100 MG tablet Take  by mouth.      TiZANidine (ZANAFLEX) 4 MG capsule 1 tablet as needed      ergocalciferol (ERGOCALCIFEROL) 1.25 MG (87831 UT) capsule ergocalciferol (vitamin D2) 1,250 mcg (50,000 unit) capsule   TAKE 1 CAPSULE BY MOUTH EVERY WEEK (Patient not taking: Reported on 12/27/2023)      [DISCONTINUED] levoFLOXacin (LEVAQUIN) 750 MG tablet levofloxacin 750 mg tablet (Patient not taking: Reported on 12/27/2023)       No current facility-administered medications on file prior to visit.        ALLERGIES:    Allergies   Allergen Reactions    Atorvastatin     Meperidine     Morphine And Related     Shellfish-Derived Products         Social History     Socioeconomic History    Marital status: Single     Spouse name: Sher Mckinnon    Years of education: College   Tobacco Use    Smoking status: Every Day     Packs/day: .5     Types: Cigarettes     Start date: 1971   Substance and Sexual Activity     "Alcohol use: No    Drug use: No    Sexual activity: Yes     Partners: Male     Comment:         Family History   Problem Relation Age of Onset    Breast cancer Mother 39    Alcohol abuse Mother     Bone cancer Mother 49    Alcohol abuse Father     Mental illness Sister     Alcohol abuse Sister     Depression Sister     Drug abuse Sister     Alcohol abuse Brother     Depression Brother     Drug abuse Brother     Heart failure Brother     Cancer Maternal Aunt         unsure of what kind    Cancer Maternal Aunt         unsure of what kind    Stomach cancer Maternal Uncle     Lung cancer Maternal Uncle     Lung cancer Maternal Uncle     Breast cancer Cousin 50    Brain cancer Cousin 60    Breast cancer Cousin 50    Breast cancer Cousin         pt states possibly 7 out of 10 Mat cousins with breast cancer    Breast cancer Cousin         Review of Systems   Musculoskeletal:  Positive for gait problem and joint swelling (left knee).        Objective     Vitals:    12/27/23 1539   BP: 121/83   Pulse: 87   Resp: 16   Temp: 96.8 °F (36 °C)   TempSrc: Temporal   SpO2: 95%   Weight: 116 kg (256 lb 12.8 oz)   Height: 172.7 cm (68\")   PainSc:   6   PainLoc: Back         12/27/2023     3:40 PM   Current Status   ECOG score 1       Physical Exam  Chest:            CONSTITUTIONAL:  Vital signs reviewed.  No distress, looks comfortable.  EYES:  Conjunctivae and lids unremarkable.  PERRLA  EARS,NOSE,MOUTH,THROAT:  Ears and nose appear unremarkable.  Lips, teeth, gums appear unremarkable.  RESPIRATORY:  Normal respiratory effort.  Lungs clear to auscultation bilaterally.  BREASTS: Left breast is benign the right breast shows recent lumpectomy scar in the upper outer quadrant an old scar that was healed there is a firm nodule under the areola at 10:00 which she states is old and due to scarring from previous radiation 3x3 cm-  CARDIOVASCULAR:  Normal S1, S2.  No murmurs rubs or gallops.  No significant lower extremity edema.  " Left knee is bandaged and  GASTROINTESTINAL: Abdomen appears unremarkable.  Nontender.  No hepatomegaly.  No splenomegaly.  LYMPHATIC:  No cervical, supraclavicular, axillary lymphadenopathy.  SKIN:  Warm.  No rashes.  PSYCHIATRIC:  Normal judgment and insight.  Normal mood and affect.  I have reexamined the patient and the results are consistent with the previously documented exam. Neville Spencer MD       RECENT LABS:  Hematology WBC   Date Value Ref Range Status   12/27/2023 9.84 3.40 - 10.80 10*3/mm3 Final   05/11/2022 12.91 (H) 4.5 - 11.0 10*3/uL Final     RBC   Date Value Ref Range Status   12/27/2023 4.86 3.77 - 5.28 10*6/mm3 Final   05/11/2022 3.85 (L) 4.0 - 5.2 10*6/uL Final     Hemoglobin   Date Value Ref Range Status   12/27/2023 15.3 12.0 - 15.9 g/dL Final   05/11/2022 11.8 (L) 12.0 - 16.0 g/dL Final     Hematocrit   Date Value Ref Range Status   12/27/2023 46.4 34.0 - 46.6 % Final   05/11/2022 35.9 (L) 36.0 - 46.0 % Final     Platelets   Date Value Ref Range Status   12/27/2023 338 140 - 450 10*3/mm3 Final   05/11/2022 280 140 - 440 10*3/uL Final          Histologic Type:  Ductal carcinoma in situ (DCIS)        Comments:  Intermediate grade DCIS with associated microcalcifications and   apocrine cytology        Ca++ showing linear distribution sometimes replacing all breast epithelium   within ducts.        Negative for invasive carcinoma.        Breast Biomarkers        Estrogen Receptor (ER) Status:  Negative        Progesterone Receptor (PgR) Status:  Negative        Extent of In-Situ Carcinoma in the Core        % of DCIS:  50%        Number of Cores Total:  6        Size of DCIS in a Single Core (mm):  4        Architectural Patterns:  Solid        Nuclear Grade:  Grade II (intermediate)        Necrosis:  Not identified        Microcalcifications:  Present in DCIS         Synoptic Report:   Specimen        Procedure:  Excision (less than total mastectomy)        Specimen Laterality:  Right  "  Tumor        Tumor Site:  Upper outer quadrant        Histologic Type:  Ductal carcinoma in situ        Comments:  Few minute foci of residual intermediate to high grade DCIS   without microcalcifications in tissue surrounding prior biopsy site.        Size (Extent) of DCIS:  1.3 Millimeters (mm)        Number of Blocks with DCIS:  3        Number of Blocks Examined:  15        Architectural Patterns:  Comedo;  Cribriform        Nuclear Grade:  Grade III (high)        Necrosis:  Present, central (expansive \"comedo\" necrosis)        Microcalcifications:  Present in nonneoplastic tissue   Margins        Margin Status:  All margins negative for DCIS        Distance from DCIS to Closest Margin:  Greater than 10 mm        Closest Margin(s) to DCIS:  Anterior;  Posterior;  Superior;  Inferior;   Medial;  Lateral        Comment on Final Margins:  Margin assessment includes evaluation of   additional margin excisions in parts B-E.   Regional Lymph Nodes        Regional Lymph Node Status:  Not applicable (no regional lymph nodes   submitted or found)   Distant Metastasis        Distant Site(s) Involved:  Not applicable   Pathologic Stage Classification (pTNM, AJCC 8th Edition)        TNM Descriptors:  Not applicable        pT Category:  pTis (DCIS)        Regional Lymph Nodes Modifier:  Not applicable        pN Category:  pN not assigned (no nodes submitted or found)        pM Category:  Not applicable - pM cannot be determined from the submitted   specimen(s)   Additional Findings        Additional Findings:  Prior biopsy site with clip; stromal calcifications   present in area of biopsy   Special Studies        Estrogen Receptor (ER) Status:  Negative        Progesterone Receptor (PgR) Status:  Negative        Testing Performed on Case Number:  [id number]   Comments        DCIS was more extensive in the prior right breast UOQ mid biopsy   ([id number]), where it was seen in multiple cores representing 50% of sampled   tissue " and associated with microcalcifications.   Best Blocks for Future Studies:  Prior biopsy [id number] A1.      Assessment & Plan   1.pTis Nx right breast ER/SD negative recurrence in a radiated breast post lumpectomy-4 mm area  History of high-grade DCIS right breast in 2015 treated with lumpectomy and radiation-margins were 0.5 and 0.8 mm at the time-no prevention given because of ER/SD negative status  Enlarging nodule at the 10 o'clock position of the right breast ultrasound in 1/24  2.  -32 gene genetic panel in 2015 with a strong family history of breast cancer including her mother was diagnosed at age 39  Extended testing done with genetics was also negative    3.  Severe degenerative disease in the left knee requiring replacement    4.  Tobacco abuse for 50 years needs screening CAT scans of the chest    5.  Anxiety and depression currently on Wellbutrin and Xanax and Zoloft    6.  Type 2 diabetes on Metformin    Plan    1. will plan to see her back in 12 months and we will add an ultrasound because of the lumpectomy site nodularity with her mammogram in January 2024  2.In the setting of normal bone density I do not think Evista is indicated  3.  Smoking cessation stressed

## 2023-12-28 ENCOUNTER — TELEPHONE (OUTPATIENT)
Dept: ONCOLOGY | Facility: CLINIC | Age: 63
End: 2023-12-28
Payer: MEDICARE

## 2023-12-28 NOTE — TELEPHONE ENCOUNTER
Caller: FAUSTINO    Relationship: Other    Best call back number: [phone]    What is the best time to reach you: ANYTIME    Who are you requesting to speak with (clinical staff, provider, specific staff member): CLINICAL    What was the call regarding: PLEASE UPDATE MAMMO AND ULTRASOUND ORDER TO SHOW BREAST PAIN AS A DX AS WELL SINCE IT WAS MENTIONED. PLEASE FAX [phone].

## 2024-02-12 ENCOUNTER — HOSPITAL ENCOUNTER (OUTPATIENT)
Dept: MAMMOGRAPHY | Facility: HOSPITAL | Age: 64
Discharge: HOME | End: 2024-02-12
Payer: COMMERCIAL

## 2024-02-12 ENCOUNTER — HOSPITAL ENCOUNTER (OUTPATIENT)
Dept: ULTRASOUND IMAGING | Facility: HOSPITAL | Age: 64
Discharge: HOME | End: 2024-02-12
Payer: COMMERCIAL

## 2024-02-12 DIAGNOSIS — D05.11 DUCTAL CARCINOMA IN SITU (DCIS) OF RIGHT BREAST: ICD-10-CM

## 2024-02-12 DIAGNOSIS — N64.4 BREAST PAIN: ICD-10-CM

## 2024-02-12 PROCEDURE — G0279 TOMOSYNTHESIS, MAMMO: HCPCS

## 2024-02-12 PROCEDURE — 77066 DX MAMMO INCL CAD BI: CPT

## 2024-02-12 PROCEDURE — 76642 ULTRASOUND BREAST LIMITED: CPT

## 2025-01-08 ENCOUNTER — TELEPHONE (OUTPATIENT)
Dept: ONCOLOGY | Facility: CLINIC | Age: 65
End: 2025-01-08
Payer: MEDICARE

## 2025-01-08 NOTE — TELEPHONE ENCOUNTER
Caller: Guerline Foley    Relationship to patient: Self    Best call back number: [phone]     Chief complaint: R/S    Type of visit: LAB & FOLLOW UP 1    Requested date: PLEASE CALL PT TO R/S , OR OK TO SEND TEXT WITH THE NEW APPT, WANTS AN APPT BEFORE NOON IF POSSIBLE.    If rescheduling, when is the original appointment: 1/9

## 2025-02-07 ENCOUNTER — TELEPHONE (OUTPATIENT)
Dept: ONCOLOGY | Facility: CLINIC | Age: 65
End: 2025-02-07
Payer: MEDICARE

## 2025-02-07 DIAGNOSIS — D05.11 DUCTAL CARCINOMA IN SITU (DCIS) OF RIGHT BREAST: Primary | ICD-10-CM

## 2025-02-07 NOTE — TELEPHONE ENCOUNTER
Referral placed to [facility]. Message sent to medical records to send referral.     ----- Message from Laura KILGORE sent at 2/7/2025  9:23 AM EST -----  Patient's insurance for 2025 is out of network with [facility] and [facility]. Patient needs to be referred out. To my knowledge, both [facility] and [facility] take her insurance.  I left a VM for patient, and future appointments have been cancelled.

## 2025-02-07 NOTE — TELEPHONE ENCOUNTER
Left VM for patient with my direct phone number, [phone].  Patient's insurance is oon with [facility] and [facility]. Patient's future appointments have been cancelled, and message sent to clinic nurse for referral to in-network facility.